# Patient Record
Sex: MALE | Race: WHITE | NOT HISPANIC OR LATINO | Employment: OTHER | URBAN - NONMETROPOLITAN AREA
[De-identification: names, ages, dates, MRNs, and addresses within clinical notes are randomized per-mention and may not be internally consistent; named-entity substitution may affect disease eponyms.]

---

## 2024-07-08 ENCOUNTER — HOSPITAL ENCOUNTER (EMERGENCY)
Facility: HOSPITAL | Age: 54
Discharge: HOME | End: 2024-07-08
Attending: EMERGENCY MEDICINE
Payer: MEDICARE

## 2024-07-08 VITALS
BODY MASS INDEX: 36.43 KG/M2 | DIASTOLIC BLOOD PRESSURE: 83 MMHG | TEMPERATURE: 98 F | HEART RATE: 72 BPM | HEIGHT: 69 IN | WEIGHT: 246 LBS | SYSTOLIC BLOOD PRESSURE: 127 MMHG | OXYGEN SATURATION: 97 % | RESPIRATION RATE: 18 BRPM

## 2024-07-08 DIAGNOSIS — G93.89 ENCEPHALOMALACIA: ICD-10-CM

## 2024-07-08 DIAGNOSIS — R56.9 NEW ONSET SEIZURE: Primary | ICD-10-CM

## 2024-07-08 DIAGNOSIS — R52 PAIN: ICD-10-CM

## 2024-07-08 DIAGNOSIS — S40.219A SHOULDER ABRASION: ICD-10-CM

## 2024-07-08 LAB
ALBUMIN SERPL-MCNC: 4 G/DL (ref 3.4–5)
ALBUMIN/GLOB SERPL: 1.2 RATIO
ALP SERPL-CCNC: 82 UNIT/L (ref 50–144)
ALT SERPL-CCNC: 39 UNIT/L (ref 1–45)
AMPHET UR QL SCN: NEGATIVE
ANION GAP SERPL CALC-SCNC: 10 MEQ/L (ref 2–13)
APTT PPP: 28.6 SECONDS (ref 23–29.4)
AST SERPL-CCNC: 30 UNIT/L (ref 17–59)
BACTERIA #/AREA URNS AUTO: NORMAL /HPF
BARBITURATE SCN PRESENT UR: NEGATIVE
BASOPHILS # BLD AUTO: 0.06 X10(3)/MCL (ref 0.01–0.08)
BASOPHILS NFR BLD AUTO: 0.4 % (ref 0.1–1.2)
BENZODIAZ UR QL SCN: POSITIVE
BILIRUB SERPL-MCNC: 0.5 MG/DL (ref 0–1)
BILIRUB UR QL STRIP.AUTO: NEGATIVE
BNP BLD-MCNC: 1990 PG/ML (ref 0–124.9)
BUN SERPL-MCNC: 21 MG/DL (ref 7–20)
CALCIUM SERPL-MCNC: 9.1 MG/DL (ref 8.4–10.2)
CANNABINOIDS UR QL SCN: NEGATIVE
CHLORIDE SERPL-SCNC: 108 MMOL/L (ref 98–110)
CLARITY UR: CLEAR
CO2 SERPL-SCNC: 27 MMOL/L (ref 21–32)
COCAINE UR QL SCN: NEGATIVE
COLOR UR AUTO: YELLOW
CREAT SERPL-MCNC: 1.02 MG/DL (ref 0.66–1.25)
CREAT/UREA NIT SERPL: 21 (ref 12–20)
EOSINOPHIL # BLD AUTO: 0 X10(3)/MCL (ref 0.04–0.54)
EOSINOPHIL NFR BLD AUTO: 0 % (ref 0.7–7)
ERYTHROCYTE [DISTWIDTH] IN BLOOD BY AUTOMATED COUNT: 15.6 %
ETHANOL BLD-MCNC: <0.01 G/DL
ETHANOL SERPL-MCNC: <10 MG/DL
GFR SERPLBLD CREATININE-BSD FMLA CKD-EPI: 87 ML/MIN/1.73/M2
GLOBULIN SER-MCNC: 3.3 GM/DL (ref 2–3.9)
GLUCOSE SERPL-MCNC: 117 MG/DL (ref 70–115)
GLUCOSE UR QL STRIP: NEGATIVE
HCT VFR BLD AUTO: 43 % (ref 36–52)
HGB BLD-MCNC: 14.3 G/DL (ref 13–18)
HGB UR QL STRIP: ABNORMAL
IMM GRANULOCYTES # BLD AUTO: 0.32 X10(3)/MCL (ref 0–0.03)
IMM GRANULOCYTES NFR BLD AUTO: 2.2 % (ref 0–0.5)
KETONES UR QL STRIP: NEGATIVE
LACTATE SERPL-SCNC: 2.1 MMOL/L (ref 0.4–2)
LACTATE SERPL-SCNC: 2.7 MMOL/L (ref 0.4–2)
LEUKOCYTE ESTERASE UR QL STRIP: NEGATIVE
LYMPHOCYTES # BLD AUTO: 1.21 X10(3)/MCL (ref 1.32–3.57)
LYMPHOCYTES NFR BLD AUTO: 8.4 % (ref 20–55)
MAGNESIUM SERPL-MCNC: 2.2 MG/DL (ref 1.8–2.4)
MCH RBC QN AUTO: 30.6 PG (ref 27–34)
MCHC RBC AUTO-ENTMCNC: 33.3 G/DL (ref 31–37)
MCV RBC AUTO: 91.9 FL (ref 79–99)
METHADONE UR QL SCN: NEGATIVE
MONOCYTES # BLD AUTO: 0.61 X10(3)/MCL (ref 0.3–0.82)
MONOCYTES NFR BLD AUTO: 4.2 % (ref 4.7–12.5)
NEUTROPHILS # BLD AUTO: 12.22 X10(3)/MCL (ref 1.78–5.38)
NEUTROPHILS NFR BLD AUTO: 84.8 % (ref 37–73)
NITRITE UR QL STRIP: NEGATIVE
NRBC BLD AUTO-RTO: 0 %
OHS QRS DURATION: 106 MS
OHS QTC CALCULATION: 498 MS
OPIATES UR QL SCN: NEGATIVE
PCP UR QL: NEGATIVE
PH UR STRIP: 6 [PH]
PH UR: 6 [PH] (ref 5–8)
PLATELET # BLD AUTO: 461 X10(3)/MCL (ref 140–371)
PMV BLD AUTO: 10.5 FL (ref 9.4–12.4)
POTASSIUM SERPL-SCNC: 3.2 MMOL/L (ref 3.5–5.1)
PROT SERPL-MCNC: 7.3 GM/DL (ref 6.3–8.2)
PROT UR QL STRIP: NEGATIVE
RBC # BLD AUTO: 4.68 X10(6)/MCL (ref 4–6)
RBC #/AREA URNS AUTO: NORMAL /HPF
SODIUM SERPL-SCNC: 145 MMOL/L (ref 136–145)
SP GR UR STRIP.AUTO: 1.01 (ref 1–1.03)
SQUAMOUS #/AREA URNS AUTO: NORMAL /HPF
UROBILINOGEN UR STRIP-ACNC: 0.2
WBC # BLD AUTO: 14.42 X10(3)/MCL (ref 4–11.5)
WBC #/AREA URNS AUTO: NORMAL /HPF

## 2024-07-08 PROCEDURE — 80307 DRUG TEST PRSMV CHEM ANLYZR: CPT | Performed by: EMERGENCY MEDICINE

## 2024-07-08 PROCEDURE — 83735 ASSAY OF MAGNESIUM: CPT | Performed by: EMERGENCY MEDICINE

## 2024-07-08 PROCEDURE — 96374 THER/PROPH/DIAG INJ IV PUSH: CPT | Mod: 59

## 2024-07-08 PROCEDURE — 85025 COMPLETE CBC W/AUTO DIFF WBC: CPT | Performed by: EMERGENCY MEDICINE

## 2024-07-08 PROCEDURE — 80053 COMPREHEN METABOLIC PANEL: CPT | Performed by: EMERGENCY MEDICINE

## 2024-07-08 PROCEDURE — 63600175 PHARM REV CODE 636 W HCPCS: Performed by: EMERGENCY MEDICINE

## 2024-07-08 PROCEDURE — 51702 INSERT TEMP BLADDER CATH: CPT

## 2024-07-08 PROCEDURE — 93005 ELECTROCARDIOGRAM TRACING: CPT

## 2024-07-08 PROCEDURE — 83880 ASSAY OF NATRIURETIC PEPTIDE: CPT | Performed by: EMERGENCY MEDICINE

## 2024-07-08 PROCEDURE — 85730 THROMBOPLASTIN TIME PARTIAL: CPT | Performed by: EMERGENCY MEDICINE

## 2024-07-08 PROCEDURE — 83605 ASSAY OF LACTIC ACID: CPT | Performed by: EMERGENCY MEDICINE

## 2024-07-08 PROCEDURE — 81003 URINALYSIS AUTO W/O SCOPE: CPT | Performed by: EMERGENCY MEDICINE

## 2024-07-08 PROCEDURE — 81015 MICROSCOPIC EXAM OF URINE: CPT | Performed by: EMERGENCY MEDICINE

## 2024-07-08 PROCEDURE — 25000003 PHARM REV CODE 250: Performed by: EMERGENCY MEDICINE

## 2024-07-08 PROCEDURE — 99285 EMERGENCY DEPT VISIT HI MDM: CPT | Mod: 25

## 2024-07-08 PROCEDURE — 93010 ELECTROCARDIOGRAM REPORT: CPT | Mod: ,,, | Performed by: INTERNAL MEDICINE

## 2024-07-08 PROCEDURE — 82077 ASSAY SPEC XCP UR&BREATH IA: CPT | Performed by: EMERGENCY MEDICINE

## 2024-07-08 RX ORDER — LOSARTAN POTASSIUM 25 MG/1
25 TABLET ORAL DAILY
COMMUNITY

## 2024-07-08 RX ORDER — PREDNISONE 10 MG/1
10 TABLET ORAL 2 TIMES DAILY
COMMUNITY

## 2024-07-08 RX ORDER — LEVETIRACETAM 500 MG/5ML
1500 INJECTION, SOLUTION, CONCENTRATE INTRAVENOUS
Status: COMPLETED | OUTPATIENT
Start: 2024-07-08 | End: 2024-07-08

## 2024-07-08 RX ORDER — FAMOTIDINE 40 MG/1
40 TABLET, FILM COATED ORAL DAILY
COMMUNITY

## 2024-07-08 RX ORDER — ASPIRIN 81 MG/1
81 TABLET ORAL DAILY
COMMUNITY

## 2024-07-08 RX ORDER — OXYCODONE AND ACETAMINOPHEN 5; 325 MG/1; MG/1
1 TABLET ORAL EVERY 8 HOURS PRN
COMMUNITY

## 2024-07-08 RX ORDER — ERTAPENEM 1 G/1
1 INJECTION, POWDER, LYOPHILIZED, FOR SOLUTION INTRAMUSCULAR; INTRAVENOUS
COMMUNITY

## 2024-07-08 RX ORDER — ACETAMINOPHEN AND PHENYLEPHRINE HCL 325; 5 MG/1; MG/1
5000 TABLET ORAL DAILY
COMMUNITY

## 2024-07-08 RX ORDER — FERROUS GLUCONATE 324(38)MG
324 TABLET ORAL
COMMUNITY

## 2024-07-08 RX ORDER — ATORVASTATIN CALCIUM 80 MG/1
80 TABLET, FILM COATED ORAL DAILY
COMMUNITY

## 2024-07-08 RX ORDER — ALLOPURINOL 300 MG/1
300 TABLET ORAL DAILY
COMMUNITY

## 2024-07-08 RX ORDER — POTASSIUM CHLORIDE 20 MEQ/1
40 TABLET, EXTENDED RELEASE ORAL
Status: COMPLETED | OUTPATIENT
Start: 2024-07-08 | End: 2024-07-08

## 2024-07-08 RX ORDER — LEVETIRACETAM 500 MG/1
500 TABLET ORAL 2 TIMES DAILY
Qty: 60 TABLET | Refills: 0 | Status: SHIPPED | OUTPATIENT
Start: 2024-07-08

## 2024-07-08 RX ORDER — FUROSEMIDE 20 MG/1
20 TABLET ORAL DAILY
COMMUNITY

## 2024-07-08 RX ADMIN — LEVETIRACETAM 1500 MG: 100 INJECTION, SOLUTION INTRAVENOUS at 09:07

## 2024-07-08 RX ADMIN — POTASSIUM CHLORIDE 40 MEQ: 1500 TABLET, EXTENDED RELEASE ORAL at 11:07

## 2024-07-08 NOTE — ED PROVIDER NOTES
Encounter Date: 7/8/2024       History     Chief Complaint   Patient presents with    Loss of Consciousness     PT arrived from [facility] via EMS w/ possible seizure activity. EMS reports snoring respirations and approx 3 min seizure activity w/ them. EMS adim 5mg versed IN and 5mg versed IV. Pt presents w/ snoring respirations.      Patient is a 50-year-old male who presents from nursing home for altered mental status and seizure activity.  Patient has no reported prior history of seizures.  He was found unconscious at the nursing home.  Ambulance was called.  Just prior to the ambulance arrival, nursing home staff witnessed patient having a seizure.  Ambulance but patient in the truck.  EN route, ambulance witnessed the patient having a 2nd seizure.  He was given 10 of Versed EN route.  He arrives to the emergency department unresponsive with sonorous respirations.  Patient unable to provide any history due to acuity of condition.  Glucose reportedly within normal limits      Review of patient's allergies indicates:   Allergen Reactions    Compazine [prochlorperazine]      Past Medical History:   Diagnosis Date    Coronary artery disease     Diabetes mellitus     GERD (gastroesophageal reflux disease)     Hypertension     Stroke      Past Surgical History:   Procedure Laterality Date    FRACTURE SURGERY       No family history on file.  Social History     Tobacco Use    Smoking status: Never    Smokeless tobacco: Never   Substance Use Topics    Alcohol use: Not Currently    Drug use: Not Currently     Review of Systems   Unable to perform ROS: Acuity of condition       Physical Exam     Initial Vitals [07/08/24 0933]   BP Pulse Resp Temp SpO2   (!) 150/104 110 (!) 40 98.5 °F (36.9 °C) 98 %      MAP       --         Physical Exam    Nursing note and vitals reviewed.  Constitutional: He appears well-developed and well-nourished. He appears distressed.   HENT:   Head: Normocephalic and atraumatic.   Eyes:  Conjunctivae and EOM are normal. Pupils are equal, round, and reactive to light.   Neck: Neck supple. No tracheal deviation present.   Cardiovascular:  Regular rhythm, normal heart sounds and intact distal pulses.           Tachycardic   Pulmonary/Chest: No respiratory distress.   Tachypneic   Abdominal: Abdomen is soft. He exhibits no distension. There is no abdominal tenderness. There is no rebound and no guarding.   Genitourinary:    Genitourinary Comments: Urine incontinence     Musculoskeletal:         General: No edema. Normal range of motion.      Cervical back: Neck supple.      Comments: Abrasion to the right shoulder.  Full range of motion of that shoulder.  No tenderness     Neurological:   Unresponsive with sonorous respirations   Skin: Skin is warm. No rash noted. No erythema.             ED Course   Procedures  Labs Reviewed   COMPREHENSIVE METABOLIC PANEL - Abnormal; Notable for the following components:       Result Value    Potassium 3.2 (*)     Glucose 117 (*)     Blood Urea Nitrogen 21 (*)     BUN/Creatinine Ratio 21 (*)     All other components within normal limits   URINALYSIS, REFLEX TO URINE CULTURE - Abnormal; Notable for the following components:    Blood, UA Trace-Intact (*)     All other components within normal limits    Narrative:      URINE STABILITY IS 2 HOURS AT ROOM TEMP OR    SIX HOURS REFRIGERATED. PERFORMING TESTING ON    SPECIMENS GREATER THAN THIS AGE MAY AFFECT THE    FOLLOWING TESTS:    PH          SPECIFIC GRAVITY           BLOOD    CLARITY     BILIRUBIN               UROBILINOGEN   LACTIC ACID, PLASMA - Abnormal; Notable for the following components:    Lactic Acid Level 2.7 (*)     All other components within normal limits   DRUG SCREEN, URINE (BEAKER) - Abnormal; Notable for the following components:    Benzodiazepine, Urine Positive (*)     All other components within normal limits    Narrative:     Cut off concentrations:    Amphetamines - 1000 ng/ml  Barbiturates - 200  ng/ml  Benzodiazepine - 200 ng/ml  Cannabinoids (THC) - 50 ng/ml  Cocaine - 300 ng/ml  Fentanyl - 1.0 ng/ml  MDMA - 500 ng/ml  Opiates - 300 ng/ml   Phencyclidine (PCP) - 25 ng/ml  Methadone - 300 ng/ml      False negatives may result form substances such as bleach added to urine.  False positives may result for the presence of a substance with similar chemical structure to the drug or its metabolite.    This test provides only a PRELIMINARY analytical test result. A more specific alternate chemical method must be used in order to obtain a confirmed analytical result. Gas chromatography/mass spectrometry (GC/MS) is the preferred confirmatory method. Other chemical confirmation methods are available. Clinical consideration and professional judgement should be applied to any drug of abuse test result, particularly when preliminary positive results are used.    Positive results will be confirmed only at the physicians request. Unconfirmed screening results are to be used only for medical purposes (treatment).          NT-PRO NATRIURETIC PEPTIDE - Abnormal; Notable for the following components:    ProBNP 1,990.0 (*)     All other components within normal limits   CBC WITH DIFFERENTIAL - Abnormal; Notable for the following components:    WBC 14.42 (*)     Platelet 461 (*)     Neut % 84.8 (*)     Lymph % 8.4 (*)     Mono % 4.2 (*)     Eos % 0.0 (*)     Lymph # 1.21 (*)     Neut # 12.22 (*)     Eos # 0.00 (*)     IG# 0.32 (*)     IG% 2.2 (*)     All other components within normal limits   LACTIC ACID, PLASMA - Abnormal; Notable for the following components:    Lactic Acid Level 2.1 (*)     All other components within normal limits   ALCOHOL,MEDICAL (ETHANOL) - Normal   APTT - Normal   MAGNESIUM - Normal   URINALYSIS, MICROSCOPIC - Normal    Narrative:     Microscopic analysis performed on unspun urine due to insufficient quantity of sample.    CBC W/ AUTO DIFFERENTIAL    Narrative:     The following orders were created for  panel order CBC auto differential.  Procedure                               Abnormality         Status                     ---------                               -----------         ------                     CBC with Differential[[id number]]       Abnormal            Final result                 Please view results for these tests on the individual orders.   POCT GLUCOSE, HAND-HELD DEVICE     EKG Readings: (Independently Interpreted)   Initial EKG taken on arrival contained a lot of artifact and was poor quality for interpretation.    Repeat EKG was done at 9:48 a.m..  Reviewed interpreted by ED provider as sinus tachycardia with a rate of 108, prolonged QTC interval, no T-wave inversions, no ST depression, no ST elevation     ECG Results              EKG 12-lead (Final result)        Collection Time Result Time QRS Duration OHS QTC Calculation    07/08/24 09:48:19 07/08/24 12:04:18 106 498                     Final result by Interface, Lab In [facility] (07/08/24 12:04:22)                   Narrative:    Test Reason : R52,    Vent. Rate : 108 BPM     Atrial Rate : 108 BPM     P-R Int : 172 ms          QRS Dur : 106 ms      QT Int : 372 ms       P-R-T Axes : 056 -51 044 degrees     QTc Int : 498 ms    Sinus tachycardia  Left anterior fascicular block  Abnormal ECG  No previous ECGs available  Confirmed by Calin LOW, Ashish ([id number]) on 7/8/2024 12:04:14 PM    Referred By: TOMMY   SELF           Confirmed By:Ashish Layton MD                                  Imaging Results              X-Ray Shoulder Complete 2 View Right (Final result)  Result time 07/08/24 13:31:19      Final result by Gonsalo Kirkpatrick MD (07/08/24 13:31:19)                   Impression:      No acute fractures or subluxations are identified.      Electronically signed by: Gonsalo Kirkpatrick MD  Date:    07/08/2024  Time:    13:31               Narrative:    EXAMINATION:  XR SHOULDER COMPLETE 2 OR MORE VIEWS RIGHT    CLINICAL HISTORY:  Right shoulder pain  after injury.    TECHNIQUE:  Two or three views of the right shoulder were performed.    COMPARISON:  None    FINDINGS:  No acute fractures or subluxations are identified.  The joint spaces are maintained.  There is some spurring inferiorly below the distal tip of the acromion.  No subcutaneous emphysema is identified.                                       X-Ray Chest AP Portable (Final result)  Result time 07/08/24 13:04:47      Final result by Gonsalo Kirkpatrick MD (07/08/24 13:04:47)                   Impression:      1. The heart appears mildly prominent, but there is magnification from AP technique and accentuation by the hypoaeration.  Otherwise, no acute chest disease is identified.  2. No fractures are identified in the partially visualized osseous structures, but evaluation is limited due to normal soft tissue technique. If there is clinical concern for rib or other osseous injury, recommend dedicated rib or other osseous series.      Electronically signed by: Gonsalo Kirkpatrick MD  Date:    07/08/2024  Time:    13:04               Narrative:      CHEST X-RAY:    History:  Chest pain after blunt trauma.    Findings:  No focal interstitial/ground-glass opacities, alveolar opacities/consolidation, effusion, or pneumothorax is seen.  The heart appears mildly prominent, but there is magnification from AP technique and accentuation by the hypoaeration.  The central pulmonary vessels and mediastinum are normal in size and are grossly unremarkable. Except for degenerative changes, the visualized osseous structures are grossly unremarkable.                                           CT Head Without Contrast (Final result)  Result time 07/08/24 09:42:06      Final result by Bc Asencio MD (07/08/24 09:42:06)                   Impression:      Right temporoparietal and occipital encephalomalacia without acute intracranial abnormality.      Electronically signed by: Bc Asencio MD  Date:    07/08/2024  Time:    09:42                Narrative:    EXAMINATION:  CT HEAD WITHOUT CONTRAST    CLINICAL HISTORY:  Head trauma, abnormal mental status (Age 19-64y);    TECHNIQUE:  Axial images of the head were obtained without IV contrast administration.  Coronal and sagittal reconstructions were provided.  Three dimensional and MIP images were obtained and evaluated.  Total DLP was 1289 mGy-cm. Dose lowering technique and automated exposure control were utilized for this exam.    COMPARISON:  None    FINDINGS:  There is normal brain formation.  There is chronic encephalomalacia in the right posterior parietal, posterior temporal, and right occipital lobe.  There is ex vacuo dilatation of the right lateral ventricle.  There is no hemorrhage, hydrocephalus, or midline shift.  There is no cytotoxic or vasogenic edema.  There is no intra or extra-axial fluid collection.  There is no herniation.    The calvarium is intact.  There is no fracture.  The bilateral orbits are normal.  The paranasal sinuses and mastoid air cells are normally developed and free of disease.                                       Medications   levETIRAcetam injection 1,500 mg (1,500 mg Intravenous Given 7/8/24 0946)   potassium chloride SA CR tablet 40 mEq (40 mEq Oral Given 7/8/24 1115)     Medical Decision Making  53-year-old male arrived to the emergency department in significant distress.  Unconscious with sinus respirations.  He did just have multiple seizures and was given 10 of Versed EN route.  Nasal trumpet was placed.  Non-rebreather placed for oxygenation.  Glucose within normal limits. Will obtain a stat CT head.  IV Keppra ordered.  Will give patient some time to recover from the postictal Versed state.  If no neuro recovery, patient will need intubation.    1:54 PM:  53-year-old male with past medical history of multiple CVAs and encephalomalacia presenting with New onset seizure.  He presented lethargic with sonorous respirations likely due to combination of  postictal state and the 10 mg of Versed he received EN route.  His oxygenation was supported with a nasal trumpet and non-rebreather until patient became more awake alert and oriented.  Once no longer postictal and under the effects of Versed, I explained to patient his new diagnosis of seizure.  Provided seizure precautions.  Will start him on Keppra.  Discussed the need for outpatient follow-up.  ER return precautions provided      Amount and/or Complexity of Data Reviewed  Independent Historian: EMS     Details: History provided by EMS and review of nursing home records  External Data Reviewed: notes.     Details: Nursing home records reviewed.  No history of seizures.  History of CVA earlier this year.  Patient on Plavix and aspirin.  No other anticoagulation  Labs: ordered. Decision-making details documented in ED Course.  Radiology: ordered and independent interpretation performed. Decision-making details documented in ED Course.  ECG/medicine tests: ordered and independent interpretation performed. Decision-making details documented in ED Course.    Risk  Prescription drug management.               ED Course as of 07/08/24 1357   Mon Jul 08, 2024   1008 Patient is still drowsy, but awakens to voice and will communicate.  He says that he has at a rehab facility because of a recent broken hip.  Says he had a stroke last year.  Denies history of prior seizures.  Denies any recent illnesses.  Nasal trumpet and non-rebreather removed.  Continue to monitor on room air at this time [DP]      ED Course User Index  [DP] Augustin Bess MD                           Clinical Impression:  Final diagnoses:  [S40.219A] Shoulder abrasion  [R56.9] New onset seizure (Primary)  [G93.89] Encephalomalacia          ED Disposition Condition    Discharge Stable          ED Prescriptions       Medication Sig Dispense Start Date End Date Auth. Provider    levETIRAcetam (KEPPRA) 500 MG Tab Take 1 tablet (500 mg total) by mouth 2 (two)  times daily. 60 tablet 7/8/2024 -- Augustin Bess MD          Follow-up Information       Follow up With Specialties Details Why Contact Info    Follow up with primary care physician and Neurology        [facility] Emergency Medicine  As needed, If symptoms worsen [id number]  [facility] [id number] [phone]             Augustin Bess MD  07/08/24 4392